# Patient Record
Sex: MALE | Race: WHITE | URBAN - METROPOLITAN AREA
[De-identification: names, ages, dates, MRNs, and addresses within clinical notes are randomized per-mention and may not be internally consistent; named-entity substitution may affect disease eponyms.]

---

## 2018-06-19 ENCOUNTER — TRANSFERRED RECORDS (OUTPATIENT)
Dept: HEALTH INFORMATION MANAGEMENT | Facility: CLINIC | Age: 23
End: 2018-06-19

## 2018-06-20 ENCOUNTER — TELEPHONE (OUTPATIENT)
Dept: OPHTHALMOLOGY | Facility: CLINIC | Age: 23
End: 2018-06-20

## 2018-06-20 NOTE — TELEPHONE ENCOUNTER
[facility] Call Center    Phone Message    May a detailed message be left on voicemail: yes    Reason for Call: Other: urgent referral to retina for foreign body in left eye, possibly containing lead.     Action Taken: Message routed to:  [facility]

## 2018-06-20 NOTE — TELEPHONE ENCOUNTER
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 Cole if ok    Will review with retina team and call back with luciano Orta RN 1:52 PM 06/20/18    Notes were faxed

## 2018-06-21 ENCOUNTER — RADIANT APPOINTMENT (OUTPATIENT)
Dept: CT IMAGING | Facility: CLINIC | Age: 23
End: 2018-06-21
Attending: OPHTHALMOLOGY

## 2018-06-21 ENCOUNTER — OFFICE VISIT (OUTPATIENT)
Dept: OPHTHALMOLOGY | Facility: CLINIC | Age: 23
End: 2018-06-21
Attending: OPHTHALMOLOGY
Payer: OTHER MISCELLANEOUS

## 2018-06-21 DIAGNOSIS — S05.92XA LEFT EYE INJURY, INITIAL ENCOUNTER: ICD-10-CM

## 2018-06-21 DIAGNOSIS — S05.92XA LEFT EYE INJURY, INITIAL ENCOUNTER: Primary | ICD-10-CM

## 2018-06-21 DIAGNOSIS — H21.02 HYPHEMA, LEFT EYE: ICD-10-CM

## 2018-06-21 DIAGNOSIS — T15.02XA FOREIGN BODY OF LEFT CORNEA, INITIAL ENCOUNTER: ICD-10-CM

## 2018-06-21 PROCEDURE — G0463 HOSPITAL OUTPT CLINIC VISIT: HCPCS | Mod: ZF

## 2018-06-21 PROCEDURE — 76513 OPH US DX ANT SGM US UNI/BI: CPT | Mod: LT,ZF | Performed by: OPHTHALMOLOGY

## 2018-06-21 PROCEDURE — 92285 EXTERNAL OCULAR PHOTOGRAPHY: CPT | Mod: ZF | Performed by: OPHTHALMOLOGY

## 2018-06-21 PROCEDURE — 76512 OPH US DX B-SCAN: CPT | Mod: 59,ZF | Performed by: OPHTHALMOLOGY

## 2018-06-21 RX ORDER — PREDNISOLONE ACETATE 10 MG/ML
SUSPENSION/ DROPS OPHTHALMIC
Refills: 0 | COMMUNITY
Start: 2018-06-08

## 2018-06-21 RX ORDER — MOXIFLOXACIN 5 MG/ML
SOLUTION/ DROPS OPHTHALMIC
Refills: 0 | COMMUNITY
Start: 2018-06-08

## 2018-06-21 RX ORDER — CYCLOPENTOLATE HYDROCHLORIDE 10 MG/ML
SOLUTION/ DROPS OPHTHALMIC
Refills: 0 | COMMUNITY
Start: 2018-06-12

## 2018-06-21 ASSESSMENT — SLIT LAMP EXAM - LIDS
COMMENTS: NORMAL
COMMENTS: PROTECTIVE PTOSIS

## 2018-06-21 ASSESSMENT — CONF VISUAL FIELD
OS_SUPERIOR_NASAL_RESTRICTION: 3
OS_INFERIOR_TEMPORAL_RESTRICTION: 3
OS_INFERIOR_NASAL_RESTRICTION: 3
OS_SUPERIOR_TEMPORAL_RESTRICTION: 3
OD_NORMAL: 1

## 2018-06-21 ASSESSMENT — TONOMETRY
OS_IOP_MMHG: 08
OD_IOP_MMHG: 18
IOP_METHOD: ICARE

## 2018-06-21 ASSESSMENT — VISUAL ACUITY
OD_SC: 20/20
METHOD: SNELLEN - LINEAR
OS_SC: 20/50

## 2018-06-21 ASSESSMENT — CUP TO DISC RATIO
OD_RATIO: 0.3
OS_RATIO: 0.3

## 2018-06-21 ASSESSMENT — EXTERNAL EXAM - LEFT EYE: OS_EXAM: NORMAL

## 2018-06-21 ASSESSMENT — EXTERNAL EXAM - RIGHT EYE: OD_EXAM: NORMAL

## 2018-06-21 NOTE — NURSING NOTE
Chief Complaints and History of Present Illnesses   Patient presents with     Consult For     Ocular foreign body     HPI    Affected eye(s):  Left   Symptoms:        Duration:  2 weeks   Frequency:  Constant       Do you have eye pain now?:  Yes   Location:  OS   Pain Level:  Mild Pain (2)   Pain Duration:  2 weeks   Pain Frequency:  Constant   Pain Characteristics:  Aching, Burning      Comments:  Pt. States hammering that he was hammering and a piece of metal went into LE.  Pain has been going up and down.  Had a terrible headache up until today.  VA has improved significantly.  Extreme light sensitivity.  Emilie Culver COT 8:37 AM June 21, 2018

## 2018-06-21 NOTE — MR AVS SNAPSHOT
After Visit Summary   2018    Eliu Mejia    MRN: [id number]           Patient Information     Date Of Birth          1995        Visit Information        Provider Department      2018 8:00 AM Donavan Spann MD [facility]        Today's Diagnoses     Left eye injury, initial encounter    -  1    Hyphema, left eye        Foreign body of left cornea, initial encounter           Follow-ups after your visit        Follow-up notes from your care team     Return if symptoms worsen or fail to improve.      Who to contact     Please call your clinic at [phone] to:    Ask questions about your health    Make or cancel appointments    Discuss your medicines    Learn about your test results    Speak to your doctor            Additional Information About Your Visit        MyChart Information     [employer] is an electronic gateway that provides easy, online access to your medical records. With [employer], you can request a clinic appointment, read your test results, renew a prescription or communicate with your care team.     To sign up for [employer] visit the website at [email or web address]   You will be asked to enter the access code listed below, as well as some personal information. Please follow the directions to create your username and password.     Your access code is: [email or web address]  Expires: 2018  6:31 AM     Your access code will  in 90 days. If you need help or a new code, please contact your [facility] or call [phone] for assistance.        Care EveryWhere ID     This is your Care EveryWhere ID. This could be used by other organizations to access your [place] medical records  [id number]         Blood Pressure from Last 3 Encounters:   No data found for BP    Weight from Last 3 Encounters:   No data found for Wt              We Performed the Following     Slit Lamp Photos OS (left eye)     UBM-Anterior Segment US  OS (left eye)     Ultrasound B-scan OS (left eye)        Primary Care Provider Fax #    Physician No Ref-Primary [phone]       No address on file        Equal Access to Services     ILYA LAURENT : Hadii maicol torres aspen Sheikh, quyen mina, chavez katavia ford, alex nievesmaile hossein. So [facility] [phone].    ATENCIÓN: Si habla español, tiene a willis disposición servicios gratuitos de asistencia lingüística. Llame al [phone].    We comply with applicable federal civil rights laws and [place] laws. We do not discriminate on the basis of race, color, national origin, age, disability, sex, sexual orientation, or gender identity.            Thank you!     Thank you for choosing [facility]  for your care. Our goal is always to provide you with excellent care. Hearing back from our patients is one way we can continue to improve our services. Please take a few minutes to complete the written survey that you may receive in the mail after your visit with us. Thank you!             Your Updated Medication List - Protect others around you: Learn how to safely use, store and throw away your medicines at www.disposemymeds.org.          This list is accurate as of 6/21/18 11:59 PM.  Always use your most recent med list.                   Brand Name Dispense Instructions for use Diagnosis    brinzolamide-brimonidine 1-0.2 % ophthalmic suspension    SIMBRINZA     1 drop 3 times daily        cyclopentolate 1 % ophthalmic solution    CYCLOGYL     INSTILL 1 DROP IN AFFECTED EYE TWICE A DAY        moxifloxacin 0.5 % ophthalmic solution    VIGAMOX     INSTILL 1 DROP INTO AFFECTED EYE(S) BY OPHTHALMIC ROUTE 3 TIMES PER DAY        prednisoLONE acetate 1 % ophthalmic susp    PRED FORTE     INSTILL 1 DROP INTO AFFECTED EYE(S) EVERY HOUR WHILE AWAKE TODAY THEN USE 4 TIMES DAILY THEREAFTER

## 2018-06-21 NOTE — TELEPHONE ENCOUNTER
Reviewed with Dr. Gustafson yesterday afternoon  Pt scheduled 6-21-18 with dr. Carol CHARLES 7:07 AM 06/21/18

## 2018-06-21 NOTE — PROGRESS NOTES
I have confirmed the patient's and reviewed Past Medical History, Past Surgical History, Social History, Family History, Problem List, Medication List and agree with Tech note.    CC: intraocular foreign body OS    HPI: Eliu Mejia is a 22 year old male who presents for evaluation of possible intraocular foreign body OS. Patient states he was hammering metal on metal (hammer and metal were both steel) on 6/9/18. He felt something go into the eye and an immediate change in vision. Not wearing eye protection at the time. He went in to an optometrist in [place], [place] and was diagnosed with hyphema. They gave him eyedrops (combigan 2/day and pred forte every two hours) and was told to return in 3 days. There was no improvement in VA or pain so he was referred to general ophthalmology in [place] 4 days later. He was then referred to retina specialist in [place] (Dr. Cole) a few days later on 6/19/18. She performed B scan which showed no Retinal detachment, no visible Foreign body, and mild VH. She was concerned clinically for CB location of Foreign body. He was started on simbrinza 2/day and cyclopentolate 3/day.    Patient reports OS had been painful since the injury (scratchy pain) which is improved since yesterday. Also reports headaches improved yesterday. Reports everything appears yellow OS and the clarity seems to be improved in the last 2 days.     Current drops OS  combigan 2/day  pred forte every hour   simbrinza 2/day  Cyclopentolate 3/day     Assessment/plan:    Trauma, left eye  -following metal on metal hammering injury  -VA 20/50 today with good view to posterior segment  -Small ST corneal lac (Lacey negative) with few small stromal metallic corneal foreign bodies  -ST peripheral iris transillumination defect and gonio in that area showed ~2 clock-hour area of iris defect with visualization to CBB without evidence of foreign body  -No intraocular foreign body on exam (including gonio), B scan, UBM, or  CT orbits   -Recommend continued monitoring  -Continue combigan, simbrinza, cyclopentolate as above  -Decrease frequency of pred forte to every two hours  -F/u early next week with Dr. Cole    Corneal laceration and metallic foreign body OS   -Lacey negative with gentle pressure  -Anterior/mid stromal fb, not superficial and not staining  -Monitor, may consider removal at follow up  -SL photos today for documentation    Hyphema OS  -light activity only, avoid bending/lifting/straining/rubbing  -no blood thinners  -IOP 08 today  -monitor closely       F/u Tues 6/26 with Dr. Cole, sooner as needed      Lola Henriquez MD   Ophthalmology PGY-3    ATTESTATION:  I have seen and examined the patient with Dr. Henriquez and agree with the findings in this note,as well as the interpretations of the diagnostic tests.    Donavan Story MD PhD.  Professor & Chair.

## 2018-06-25 ENCOUNTER — TELEPHONE (OUTPATIENT)
Dept: OPHTHALMOLOGY | Facility: CLINIC | Age: 23
End: 2018-06-25

## 2018-06-25 NOTE — TELEPHONE ENCOUNTER
"[facility] Call Center    Phone Message    May a detailed message be left on voicemail: yes    Reason for Call: [facility] in [place] would like a copy of appt notes with Dr. Spann. His notes state \"F/u Tues 6/26 with Dr. Cole, sooner as needed\" so clinic would like appt notes before pt's appointment with them tomorrow. The [facility] would like them faxed to [phone] with reina Gutierrez on the fax.       Action Taken: Message routed to:  [facility]    "